# Patient Record
Sex: FEMALE | Race: WHITE | Employment: OTHER | URBAN - METROPOLITAN AREA
[De-identification: names, ages, dates, MRNs, and addresses within clinical notes are randomized per-mention and may not be internally consistent; named-entity substitution may affect disease eponyms.]

---

## 2024-03-26 ENCOUNTER — HOSPITAL ENCOUNTER (OUTPATIENT)
Age: 19
Discharge: HOME | End: 2024-03-26
Payer: COMMERCIAL

## 2024-03-26 VITALS
DIASTOLIC BLOOD PRESSURE: 79 MMHG | TEMPERATURE: 99 F | SYSTOLIC BLOOD PRESSURE: 132 MMHG | RESPIRATION RATE: 16 BRPM | HEART RATE: 83 BPM | OXYGEN SATURATION: 96 %

## 2024-03-26 DIAGNOSIS — J02.9 ACUTE VIRAL PHARYNGITIS: Primary | ICD-10-CM

## 2024-03-26 LAB — S PYO AG THROAT QL: NEGATIVE

## 2024-03-26 PROCEDURE — 87880 STREP A ASSAY W/OPTIC: CPT | Performed by: NURSE PRACTITIONER

## 2024-03-26 PROCEDURE — 99203 OFFICE O/P NEW LOW 30 MIN: CPT | Performed by: NURSE PRACTITIONER

## 2024-03-26 RX ORDER — DEXAMETHASONE 4 MG/1
12 TABLET ORAL ONCE
Status: COMPLETED | OUTPATIENT
Start: 2024-03-26 | End: 2024-03-26

## 2024-03-26 NOTE — ED INITIAL ASSESSMENT (HPI)
Pt with c/o sore throat and pain with swallowing, HA, low grade fever at night. Symptoms started Saturday

## 2024-03-27 NOTE — ED PROVIDER NOTES
Patient Seen in: [facility]      History     Chief Complaint   Patient presents with    Sore Throat    Headache    Ear Problem Pain     Stated Complaint: cold like symptoms    Subjective:   18-year-old female presents to complaints of headaches sore throat and ear pain.  Symptoms over the last 2 to 3 days.  Denies any fever or chills.  Did a COVID test at home yesterday which was negative.  No difficulty swallowing controlling secretions.  No stridor shortness of breath.  No other symptoms or concerns.  The patient's medication list, past medical history and social history elements as listed in today's nurse's notes were reviewed and agreed (except as otherwise stated in the HPI).  The patient's family history reviewed and determined to be noncontributory to the presenting problem            Objective:   History reviewed. No pertinent past medical history.           No pertinent past surgical history.              No pertinent social history.            Review of Systems    Positive for stated complaint: cold like symptoms  Other systems are as noted in HPI.  Constitutional and vital signs reviewed.      All other systems reviewed and negative except as noted above.    Physical Exam     ED Triage Vitals [03/26/24 1849]   /79   Pulse 83   Resp 16   Temp 98.8 °F (37.1 °C)   Temp src Temporal   SpO2 96 %   O2 Device None (Room air)       Current:/79   Pulse 83   Temp 98.8 °F (37.1 °C) (Temporal)   Resp 16   LMP 03/25/2024 (Exact Date)   SpO2 96%         Physical Exam  Vitals and nursing note reviewed.   Constitutional:       Appearance: She is well-developed. She is obese.   HENT:      Head: Normocephalic.      Right Ear: Tympanic membrane and ear canal normal.      Left Ear: Tympanic membrane normal.      Nose: No congestion or rhinorrhea.      Mouth/Throat:      Mouth: Mucous membranes are moist.      Pharynx: Pharyngeal swelling and posterior oropharyngeal erythema present.       Tonsils: No tonsillar exudate. 1+ on the right. 1+ on the left.   Cardiovascular:      Rate and Rhythm: Normal rate.   Pulmonary:      Effort: Pulmonary effort is normal.      Breath sounds: Normal breath sounds.   Musculoskeletal:      Cervical back: Normal range of motion and neck supple.   Lymphadenopathy:      Cervical: Cervical adenopathy present.   Skin:     General: Skin is warm and dry.   Neurological:      Mental Status: She is alert and oriented to person, place, and time.               ED Course     Labs Reviewed   POCT RAPID STREP - Normal                      MDM     Please note that this report has been produced using speech recognition software and may contain errors related to that system including, but not limited to, errors in grammar, punctuation, and spelling, as well as words and phrases that possibly may have been recognized inappropriately.  If there are any questions or concerns, contact the dictating provider for clarification.        Note to patient: The 21st Century Cures Act makes medical notes like these available to patients in the interest of transparency. However, this is a medical document intended as peer to peer communication. It is written in medical language and may contain abbreviations or verbiage that are unfamiliar. It may appear blunt or direct. Medical documents are intended to carry relevant information, facts as evident, and the clinical opinion of the practitioner.                                   Medical Decision Making  Differential diagnosis includes but is not limited to: Viral pharyngitis, strep throat, peritonsillar abscess, COVID-19      Presents today with complaints of sore throat, headache and ear pain started 2 to 3 days prior to arrival.  No difficulty swallowing controlling screws.  Does have swelling to the tonsils.  Rapid strep was done and negative.  Patient was given dose of Decadron here in the office to help with inflammation.  Did suspect viral cause  of illness.  Sore throat care instructions given.  To alternate Tyle Motrin for fever pain.  To follow-up with primary care physician in 1 week if symptoms do not improve.  Patient and caretaker verbalized understanding and agreed to plan of care.    Amount and/or Complexity of Data Reviewed  Independent Historian: caregiver  Labs: ordered.     Details: Rapid strep-negative    Risk  OTC drugs.        Disposition and Plan     Clinical Impression:  1. Acute viral pharyngitis         Disposition:  Discharge  3/26/2024  7:18 pm    Follow-up:  PCP    In 1 week  As needed          Medications Prescribed:  There are no discharge medications for this patient.